# Patient Record
Sex: FEMALE | Race: OTHER | HISPANIC OR LATINO | Employment: FULL TIME | URBAN - METROPOLITAN AREA
[De-identification: names, ages, dates, MRNs, and addresses within clinical notes are randomized per-mention and may not be internally consistent; named-entity substitution may affect disease eponyms.]

---

## 2022-11-10 ENCOUNTER — PRE-ADMISSION TESTING (OUTPATIENT)
Dept: ADMISSIONS | Facility: MEDICAL CENTER | Age: 37
End: 2022-11-10
Attending: SPECIALIST
Payer: MEDICAID

## 2022-11-10 DIAGNOSIS — Z01.812 PRE-OPERATIVE LABORATORY EXAMINATION: ICD-10-CM

## 2022-11-10 LAB
ANION GAP SERPL CALC-SCNC: 12 MMOL/L (ref 7–16)
BASOPHILS # BLD AUTO: 0.7 % (ref 0–1.8)
BASOPHILS # BLD: 0.05 K/UL (ref 0–0.12)
BUN SERPL-MCNC: 18 MG/DL (ref 8–22)
CALCIUM SERPL-MCNC: 9.2 MG/DL (ref 8.5–10.5)
CHLORIDE SERPL-SCNC: 106 MMOL/L (ref 96–112)
CO2 SERPL-SCNC: 21 MMOL/L (ref 20–33)
CREAT SERPL-MCNC: 0.69 MG/DL (ref 0.5–1.4)
EOSINOPHIL # BLD AUTO: 0.09 K/UL (ref 0–0.51)
EOSINOPHIL NFR BLD: 1.3 % (ref 0–6.9)
ERYTHROCYTE [DISTWIDTH] IN BLOOD BY AUTOMATED COUNT: 45.4 FL (ref 35.9–50)
GFR SERPLBLD CREATININE-BSD FMLA CKD-EPI: 114 ML/MIN/1.73 M 2
GLUCOSE SERPL-MCNC: 83 MG/DL (ref 65–99)
HCT VFR BLD AUTO: 42 % (ref 37–47)
HGB BLD-MCNC: 13.7 G/DL (ref 12–16)
IMM GRANULOCYTES # BLD AUTO: 0.03 K/UL (ref 0–0.11)
IMM GRANULOCYTES NFR BLD AUTO: 0.4 % (ref 0–0.9)
LYMPHOCYTES # BLD AUTO: 1.91 K/UL (ref 1–4.8)
LYMPHOCYTES NFR BLD: 28.2 % (ref 22–41)
MCH RBC QN AUTO: 30.4 PG (ref 27–33)
MCHC RBC AUTO-ENTMCNC: 32.6 G/DL (ref 33.6–35)
MCV RBC AUTO: 93.3 FL (ref 81.4–97.8)
MONOCYTES # BLD AUTO: 0.54 K/UL (ref 0–0.85)
MONOCYTES NFR BLD AUTO: 8 % (ref 0–13.4)
NEUTROPHILS # BLD AUTO: 4.15 K/UL (ref 2–7.15)
NEUTROPHILS NFR BLD: 61.4 % (ref 44–72)
NRBC # BLD AUTO: 0 K/UL
NRBC BLD-RTO: 0 /100 WBC
PLATELET # BLD AUTO: 293 K/UL (ref 164–446)
PMV BLD AUTO: 11.3 FL (ref 9–12.9)
POTASSIUM SERPL-SCNC: 4.7 MMOL/L (ref 3.6–5.5)
RBC # BLD AUTO: 4.5 M/UL (ref 4.2–5.4)
SODIUM SERPL-SCNC: 139 MMOL/L (ref 135–145)
WBC # BLD AUTO: 6.8 K/UL (ref 4.8–10.8)

## 2022-11-10 PROCEDURE — 80048 BASIC METABOLIC PNL TOTAL CA: CPT

## 2022-11-10 PROCEDURE — 85025 COMPLETE CBC W/AUTO DIFF WBC: CPT

## 2022-11-10 PROCEDURE — 36415 COLL VENOUS BLD VENIPUNCTURE: CPT

## 2022-11-10 RX ORDER — MULTIVITAMIN
1 TABLET ORAL DAILY
COMMUNITY
End: 2024-01-10

## 2022-11-16 NOTE — H&P
IDENTIFICATION:  The patient is a very pleasant 37-year-old multipara (para 2,   with 2 previous vaginal deliveries).     CHIEF COMPLAINT:  The patient complains of menometrorrhagia.     HISTORY OF PRESENT ILLNESS:  The patient has been complaining of   menometrorrhagia and also periovulatory bleeding and also postcoital bleeding.    When I saw the patient on 9/6/2022, I did on that day perform a transvaginal   pelvic ultrasound because of her recent irregular/abnormal uterine bleeding   as well as postcoital bleeding and this ultrasound revealed that the   endometrial stripe was somewhat thickened at 16 mm.  I recommended to her that   we proceed with hysteroscopy and D and C and in fact fractional D and C. She   is scheduled to have a hysteroscopy, D and C (fractional D and C) and   hydrothermal endometrial ablation.  Of note, she also requests permanent tubal   sterilization and so we will proceed that only with hysteroscopy, fractional   D and C, and hydrothermal endometrial ablation but also laparoscopy with   laparoscopic bilateral tubal sterilization procedure, namely laparoscopic   bilateral salpingectomy.     PAST MEDICAL HISTORY:  The patient says that she has no medical illnesses.     PAST SURGICAL HISTORY:  The patient says she has had no previous surgeries.     MEDICATIONS:  The patient says she takes no medications.     ALLERGIES:  The patient says she has no known drug allergies.     SOCIAL HISTORY:  The patient denies smoking.  She denies consuming alcoholic   beverages.  She denies the use of recreational drugs.     REVIEW OF SYSTEMS:  GENERAL:  The patient denies any fevers or chills or sweats.  PULMONARY:  The patient recently has had an upper respiratory tract infection   and has had a nonproductive cough and experiences chest pain with coughing.    She denies any wheezing and she denies any shortness of breath.  CARDIOVASCULAR:  The patient denies any palpitations, chest pain (except for    chest pain with coughing), dyspnea.  GASTROINTESTINAL:  The patient denies any nausea, vomiting, diarrhea,   constipation, hematochezia, melena.  GENITOURINARY:  The patient is experiencing menometrorrhagia and postcoital   bleeding and periovulatory bleeding.  She does not report any pelvic pain or   dysmenorrhea.  MUSCULOSKELETAL:  The patient denies any arthralgias or myalgias.  NEUROLOGICAL:  The patient denies any headaches or syncope or seizures.     PHYSICAL EXAMINATION:  VITAL SIGNS:  The patient's vital signs are stable and she is afebrile.  Her   recent blood pressure is 138/76.  Her stated height of 5 feet 1 inches and   weight of 146 pounds correspond to a body mass index of 27.6.  GENERAL:  The patient appears well developed and well nourished and relaxed   and alert and comfortable and in no apparent distress.  HEENT:  Normocephalic, atraumatic.  Pupils equal, round, reactive to light and   accommodation.  Extraocular motions intact.  Pharynx clear.  There is no   thyromegaly.  There is no cervical lymphadenopathy.  CHEST:  Regular rate and rhythm, no murmur.  LUNGS:  The lungs are clear to auscultation bilaterally.  ABDOMEN:  Examination of the patient's abdomen with the patient in the dorsal   supine position reveals that the abdomen is soft and nontender and   nondistended and there is no evidence of hepatomegaly and there is no evidence   of splenomegaly and there is no evidence detected of any abdominal masses.  PELVIC:  Bimanual exam reveals no evidence of cervical motion tenderness, no   evidence of any tenderness to palpation of the uterine corpus, no evidence of   any uterine enlargement and no evidence of any adnexal masses or tenderness   either on the right or the left.  EXTREMITIES:  No clubbing, cyanosis or edema.  NEUROLOGIC:  Nonfocal.     ASSESSMENT:  1.  Menometrorrhagia.  2.  The patient desires permanent tubal sterilization.  3.  It appears the patient has an upper respiratory  tract infection and   perhaps even a bronchitis and appears that this is resolving and is not   severe.     PLAN:  The patient is scheduled for hysteroscopy, fractional D and C and   hydrothermal endometrial ablation to be followed by laparoscopy, laparoscopic   bilateral salpingectomy.  I have discussed with the patient and explained to   the patient in detail and at length what hysteroscopy, fractional D and C, and   hydrothermal endometrial ablation along with laparoscopy with laparoscopic   bilateral salpingectomy is and one such procedures involved along with the   risks and benefits and alternatives and after our discussions and after   answering her questions, she told me that she very much wishes for us to   proceed with hysteroscopy, fractional D and C, and hydrothermal endometrial   ablation followed by laparoscopy with laparoscopic bilateral salpingectomy.        ______________________________  MD JOSE Romano/OLGA LIDIA    DD:  11/15/2022 16:25  DT:  11/15/2022 17:48    Job#:  [id number]

## 2022-11-17 ENCOUNTER — HOSPITAL ENCOUNTER (OUTPATIENT)
Facility: MEDICAL CENTER | Age: 37
End: 2022-11-17
Attending: SPECIALIST | Admitting: SPECIALIST
Payer: MEDICAID

## 2022-11-17 ENCOUNTER — ANESTHESIA EVENT (OUTPATIENT)
Dept: SURGERY | Facility: MEDICAL CENTER | Age: 37
End: 2022-11-17
Payer: MEDICAID

## 2022-11-17 ENCOUNTER — ANESTHESIA (OUTPATIENT)
Dept: SURGERY | Facility: MEDICAL CENTER | Age: 37
End: 2022-11-17
Payer: MEDICAID

## 2022-11-17 VITALS
BODY MASS INDEX: 27.68 KG/M2 | OXYGEN SATURATION: 95 % | DIASTOLIC BLOOD PRESSURE: 70 MMHG | RESPIRATION RATE: 20 BRPM | SYSTOLIC BLOOD PRESSURE: 116 MMHG | HEIGHT: 61 IN | WEIGHT: 146.61 LBS | TEMPERATURE: 98.2 F | HEART RATE: 84 BPM

## 2022-11-17 DIAGNOSIS — G89.18 POST-OP PAIN: ICD-10-CM

## 2022-11-17 PROBLEM — N92.0 MENORRHAGIA: Status: ACTIVE | Noted: 2022-11-17

## 2022-11-17 PROBLEM — Z30.2 ENCOUNTER FOR STERILIZATION: Status: ACTIVE | Noted: 2022-11-17

## 2022-11-17 LAB
HCG UR QL: NEGATIVE
PATHOLOGY CONSULT NOTE: NORMAL

## 2022-11-17 PROCEDURE — 160002 HCHG RECOVERY MINUTES (STAT): Performed by: SPECIALIST

## 2022-11-17 PROCEDURE — 160046 HCHG PACU - 1ST 60 MINS PHASE II: Performed by: SPECIALIST

## 2022-11-17 PROCEDURE — 160025 RECOVERY II MINUTES (STATS): Performed by: SPECIALIST

## 2022-11-17 PROCEDURE — 700111 HCHG RX REV CODE 636 W/ 250 OVERRIDE (IP): Performed by: ANESTHESIOLOGY

## 2022-11-17 PROCEDURE — 00840 ANES IPER PX LOWER ABD NOS: CPT | Performed by: ANESTHESIOLOGY

## 2022-11-17 PROCEDURE — 700102 HCHG RX REV CODE 250 W/ 637 OVERRIDE(OP): Performed by: ANESTHESIOLOGY

## 2022-11-17 PROCEDURE — 160047 HCHG PACU  - EA ADDL 30 MINS PHASE II: Performed by: SPECIALIST

## 2022-11-17 PROCEDURE — 700105 HCHG RX REV CODE 258: Performed by: SPECIALIST

## 2022-11-17 PROCEDURE — 88302 TISSUE EXAM BY PATHOLOGIST: CPT

## 2022-11-17 PROCEDURE — 160009 HCHG ANES TIME/MIN: Performed by: SPECIALIST

## 2022-11-17 PROCEDURE — 160048 HCHG OR STATISTICAL LEVEL 1-5: Performed by: SPECIALIST

## 2022-11-17 PROCEDURE — 110371 HCHG SHELL REV 272: Performed by: SPECIALIST

## 2022-11-17 PROCEDURE — 88305 TISSUE EXAM BY PATHOLOGIST: CPT

## 2022-11-17 PROCEDURE — A9270 NON-COVERED ITEM OR SERVICE: HCPCS | Performed by: SPECIALIST

## 2022-11-17 PROCEDURE — 160035 HCHG PACU - 1ST 60 MINS PHASE I: Performed by: SPECIALIST

## 2022-11-17 PROCEDURE — 160041 HCHG SURGERY MINUTES - EA ADDL 1 MIN LEVEL 4: Performed by: SPECIALIST

## 2022-11-17 PROCEDURE — 700102 HCHG RX REV CODE 250 W/ 637 OVERRIDE(OP): Performed by: SPECIALIST

## 2022-11-17 PROCEDURE — 81025 URINE PREGNANCY TEST: CPT

## 2022-11-17 PROCEDURE — A9270 NON-COVERED ITEM OR SERVICE: HCPCS | Performed by: ANESTHESIOLOGY

## 2022-11-17 PROCEDURE — 700101 HCHG RX REV CODE 250: Performed by: SPECIALIST

## 2022-11-17 PROCEDURE — 700105 HCHG RX REV CODE 258: Performed by: ANESTHESIOLOGY

## 2022-11-17 PROCEDURE — 160029 HCHG SURGERY MINUTES - 1ST 30 MINS LEVEL 4: Performed by: SPECIALIST

## 2022-11-17 PROCEDURE — 700101 HCHG RX REV CODE 250: Performed by: ANESTHESIOLOGY

## 2022-11-17 RX ORDER — HYDROMORPHONE HYDROCHLORIDE 1 MG/ML
0.1 INJECTION, SOLUTION INTRAMUSCULAR; INTRAVENOUS; SUBCUTANEOUS
Status: DISCONTINUED | OUTPATIENT
Start: 2022-11-17 | End: 2022-11-17 | Stop reason: HOSPADM

## 2022-11-17 RX ORDER — DIPHENHYDRAMINE HYDROCHLORIDE 50 MG/ML
12.5 INJECTION INTRAMUSCULAR; INTRAVENOUS
Status: DISCONTINUED | OUTPATIENT
Start: 2022-11-17 | End: 2022-11-17 | Stop reason: HOSPADM

## 2022-11-17 RX ORDER — DEXAMETHASONE SODIUM PHOSPHATE 4 MG/ML
INJECTION, SOLUTION INTRA-ARTICULAR; INTRALESIONAL; INTRAMUSCULAR; INTRAVENOUS; SOFT TISSUE PRN
Status: DISCONTINUED | OUTPATIENT
Start: 2022-11-17 | End: 2022-11-17 | Stop reason: SURG

## 2022-11-17 RX ORDER — OXYCODONE HCL 5 MG/5 ML
5 SOLUTION, ORAL ORAL
Status: COMPLETED | OUTPATIENT
Start: 2022-11-17 | End: 2022-11-17

## 2022-11-17 RX ORDER — MIDAZOLAM HYDROCHLORIDE 1 MG/ML
INJECTION INTRAMUSCULAR; INTRAVENOUS PRN
Status: DISCONTINUED | OUTPATIENT
Start: 2022-11-17 | End: 2022-11-17 | Stop reason: SURG

## 2022-11-17 RX ORDER — HYDROMORPHONE HYDROCHLORIDE 1 MG/ML
0.2 INJECTION, SOLUTION INTRAMUSCULAR; INTRAVENOUS; SUBCUTANEOUS
Status: DISCONTINUED | OUTPATIENT
Start: 2022-11-17 | End: 2022-11-17 | Stop reason: HOSPADM

## 2022-11-17 RX ORDER — SODIUM CHLORIDE, SODIUM LACTATE, POTASSIUM CHLORIDE, CALCIUM CHLORIDE 600; 310; 30; 20 MG/100ML; MG/100ML; MG/100ML; MG/100ML
INJECTION, SOLUTION INTRAVENOUS CONTINUOUS
Status: DISCONTINUED | OUTPATIENT
Start: 2022-11-17 | End: 2022-11-17 | Stop reason: HOSPADM

## 2022-11-17 RX ORDER — ONDANSETRON 2 MG/ML
4 INJECTION INTRAMUSCULAR; INTRAVENOUS ONCE
Status: COMPLETED | OUTPATIENT
Start: 2022-11-17 | End: 2022-11-17

## 2022-11-17 RX ORDER — CEFAZOLIN SODIUM 1 G/3ML
INJECTION, POWDER, FOR SOLUTION INTRAMUSCULAR; INTRAVENOUS PRN
Status: DISCONTINUED | OUTPATIENT
Start: 2022-11-17 | End: 2022-11-17 | Stop reason: SURG

## 2022-11-17 RX ORDER — SODIUM CHLORIDE, SODIUM LACTATE, POTASSIUM CHLORIDE, CALCIUM CHLORIDE 600; 310; 30; 20 MG/100ML; MG/100ML; MG/100ML; MG/100ML
INJECTION, SOLUTION INTRAVENOUS
Status: DISCONTINUED | OUTPATIENT
Start: 2022-11-17 | End: 2022-11-17 | Stop reason: SURG

## 2022-11-17 RX ORDER — OXYCODONE HYDROCHLORIDE AND ACETAMINOPHEN 5; 325 MG/1; MG/1
1 TABLET ORAL EVERY 6 HOURS PRN
Qty: 28 TABLET | Refills: 0 | Status: SHIPPED | OUTPATIENT
Start: 2022-11-17 | End: 2022-11-24

## 2022-11-17 RX ORDER — BACITRACIN ZINC 500 [USP'U]/G
OINTMENT TOPICAL
Status: DISCONTINUED | OUTPATIENT
Start: 2022-11-17 | End: 2022-11-17 | Stop reason: HOSPADM

## 2022-11-17 RX ORDER — MEPERIDINE HYDROCHLORIDE 25 MG/ML
12.5 INJECTION INTRAMUSCULAR; INTRAVENOUS; SUBCUTANEOUS
Status: DISCONTINUED | OUTPATIENT
Start: 2022-11-17 | End: 2022-11-17 | Stop reason: HOSPADM

## 2022-11-17 RX ORDER — OXYCODONE HCL 5 MG/5 ML
10 SOLUTION, ORAL ORAL
Status: COMPLETED | OUTPATIENT
Start: 2022-11-17 | End: 2022-11-17

## 2022-11-17 RX ORDER — SODIUM CHLORIDE, SODIUM LACTATE, POTASSIUM CHLORIDE, CALCIUM CHLORIDE 600; 310; 30; 20 MG/100ML; MG/100ML; MG/100ML; MG/100ML
INJECTION, SOLUTION INTRAVENOUS CONTINUOUS
Status: ACTIVE | OUTPATIENT
Start: 2022-11-17 | End: 2022-11-17

## 2022-11-17 RX ORDER — HYDROMORPHONE HYDROCHLORIDE 1 MG/ML
0.4 INJECTION, SOLUTION INTRAMUSCULAR; INTRAVENOUS; SUBCUTANEOUS
Status: DISCONTINUED | OUTPATIENT
Start: 2022-11-17 | End: 2022-11-17 | Stop reason: HOSPADM

## 2022-11-17 RX ORDER — ONDANSETRON 2 MG/ML
INJECTION INTRAMUSCULAR; INTRAVENOUS PRN
Status: DISCONTINUED | OUTPATIENT
Start: 2022-11-17 | End: 2022-11-17 | Stop reason: SURG

## 2022-11-17 RX ORDER — ACETAMINOPHEN 500 MG
1000 TABLET ORAL ONCE
Status: COMPLETED | OUTPATIENT
Start: 2022-11-17 | End: 2022-11-17

## 2022-11-17 RX ORDER — BACITRACIN ZINC 500 [USP'U]/G
OINTMENT TOPICAL
Status: DISCONTINUED
Start: 2022-11-17 | End: 2022-11-17 | Stop reason: HOSPADM

## 2022-11-17 RX ORDER — KETOROLAC TROMETHAMINE 30 MG/ML
INJECTION, SOLUTION INTRAMUSCULAR; INTRAVENOUS PRN
Status: DISCONTINUED | OUTPATIENT
Start: 2022-11-17 | End: 2022-11-17 | Stop reason: SURG

## 2022-11-17 RX ADMIN — DEXAMETHASONE SODIUM PHOSPHATE 4 MG: 4 INJECTION, SOLUTION INTRA-ARTICULAR; INTRALESIONAL; INTRAMUSCULAR; INTRAVENOUS; SOFT TISSUE at 13:22

## 2022-11-17 RX ADMIN — FENTANYL CITRATE 100 MCG: 50 INJECTION, SOLUTION INTRAMUSCULAR; INTRAVENOUS at 13:31

## 2022-11-17 RX ADMIN — FENTANYL CITRATE 50 MCG: 50 INJECTION, SOLUTION INTRAMUSCULAR; INTRAVENOUS at 14:03

## 2022-11-17 RX ADMIN — FENTANYL CITRATE 50 MCG: 50 INJECTION, SOLUTION INTRAMUSCULAR; INTRAVENOUS at 14:24

## 2022-11-17 RX ADMIN — MIDAZOLAM HYDROCHLORIDE 2 MG: 1 INJECTION, SOLUTION INTRAMUSCULAR; INTRAVENOUS at 13:00

## 2022-11-17 RX ADMIN — ROCURONIUM BROMIDE 30 MG: 10 INJECTION, SOLUTION INTRAVENOUS at 13:35

## 2022-11-17 RX ADMIN — FENTANYL CITRATE 25 MCG: 50 INJECTION INTRAMUSCULAR; INTRAVENOUS at 15:30

## 2022-11-17 RX ADMIN — CEFAZOLIN 2 G: 330 INJECTION, POWDER, FOR SOLUTION INTRAMUSCULAR; INTRAVENOUS at 13:17

## 2022-11-17 RX ADMIN — FENTANYL CITRATE 25 MCG: 50 INJECTION INTRAMUSCULAR; INTRAVENOUS at 15:45

## 2022-11-17 RX ADMIN — SODIUM CHLORIDE, POTASSIUM CHLORIDE, SODIUM LACTATE AND CALCIUM CHLORIDE: 600; 310; 30; 20 INJECTION, SOLUTION INTRAVENOUS at 13:00

## 2022-11-17 RX ADMIN — PROPOFOL 200 MG: 10 INJECTION, EMULSION INTRAVENOUS at 13:03

## 2022-11-17 RX ADMIN — FENTANYL CITRATE 25 MCG: 50 INJECTION INTRAMUSCULAR; INTRAVENOUS at 15:17

## 2022-11-17 RX ADMIN — FENTANYL CITRATE 25 MCG: 50 INJECTION INTRAMUSCULAR; INTRAVENOUS at 16:23

## 2022-11-17 RX ADMIN — SUGAMMADEX 200 MG: 100 INJECTION, SOLUTION INTRAVENOUS at 14:24

## 2022-11-17 RX ADMIN — OXYCODONE HYDROCHLORIDE 10 MG: 5 SOLUTION ORAL at 16:40

## 2022-11-17 RX ADMIN — KETOROLAC TROMETHAMINE 30 MG: 30 INJECTION, SOLUTION INTRAMUSCULAR at 14:32

## 2022-11-17 RX ADMIN — ONDANSETRON 4 MG: 2 INJECTION INTRAMUSCULAR; INTRAVENOUS at 15:10

## 2022-11-17 RX ADMIN — ROCURONIUM BROMIDE 50 MG: 10 INJECTION, SOLUTION INTRAVENOUS at 13:04

## 2022-11-17 RX ADMIN — SODIUM CHLORIDE, POTASSIUM CHLORIDE, SODIUM LACTATE AND CALCIUM CHLORIDE: 600; 310; 30; 20 INJECTION, SOLUTION INTRAVENOUS at 11:52

## 2022-11-17 RX ADMIN — ACETAMINOPHEN 1000 MG: 500 TABLET ORAL at 15:10

## 2022-11-17 RX ADMIN — ONDANSETRON 4 MG: 2 INJECTION INTRAMUSCULAR; INTRAVENOUS at 13:22

## 2022-11-17 RX ADMIN — FENTANYL CITRATE 50 MCG: 50 INJECTION, SOLUTION INTRAMUSCULAR; INTRAVENOUS at 14:34

## 2022-11-17 ASSESSMENT — PAIN DESCRIPTION - PAIN TYPE: TYPE: SURGICAL PAIN

## 2022-11-17 ASSESSMENT — PAIN SCALES - GENERAL: PAIN_LEVEL: 1

## 2022-11-17 NOTE — ANESTHESIA PROCEDURE NOTES
Airway    Date/Time: 11/17/2022 1:10 PM  Performed by: Jun Flores M.D.  Authorized by: Jun Flores M.D.     Location:  OR  Urgency:  Elective  Indications for Airway Management:  Anesthesia      Spontaneous Ventilation: absent    Sedation Level:  Deep  Preoxygenated: Yes    Patient Position:  Sniffing  Final Airway Type:  Endotracheal airway  Final Endotracheal Airway:  ETT  Cuffed: Yes    Technique Used for Successful ETT Placement:  Direct laryngoscopy    Insertion Site:  Oral  Blade Type:  Alvarez  Laryngoscope Blade/Videolaryngoscope Blade Size:  2  ETT Size (mm):  7.5  Measured from:  Teeth  ETT to Teeth (cm):  21  Placement Verified by: auscultation and capnometry    Cormack-Lehane Classification:  Grade I - full view of glottis  Number of Attempts at Approach:  1

## 2022-11-17 NOTE — DISCHARGE INSTRUCTIONS
Pelvic Laparoscopy Discharge instructions    ACTIVITIES:  DO NOT USE tampons, douche, or have sexual intercourse until cleared at your follow-up appointment.    After discharge from the hospital, rest today, then you may resume full routine activities. However, there should be no heavy lifting (greater than 10 pounds), avoid straining, and no strenuous activities until after your follow-up visit. Otherwise, routine activities of daily living are acceptable.    DRIVING:   You may drive whenever you are off pain medications and are able to perform the activities needed to drive, i.e. turning, bending, twisting, etc.    BATHING:   You may shower starting tomorrow pat incisions dry with a clean towel do not rub. No tub baths, hot tubs, or swimming until your follow up appointment.     WOUND CARE:  You will have one or more small incisions. If you have wound dressings, they may come off after 48 hours. If you have skin glue to the wound, this will fall off on its own, do not pick at it. If you have steri strips to the wound, these will fall off on their own, do not pick at them, may trim the edges if needed.     You may experience discomfort in the shoulder area, mild breathing difficulty, aching in the upper back and bloating for 2 to 3 days after this procedure due to the air inflated into the abdomen during your surgery.     Expect some vaginal bleeding, however, if you pass clots or saturate a cj pad more often than once an hour or are not comfortable with the amount of blood you notice please notify your provider.      BOWEL FUNCTION:  Constipation is common after surgery. The combination of pain medication and decreased activity level can cause constipation in otherwise normal patients. If you feel this is occurring, take a laxative (Milk of Magnesia, Ex-Lax, Senokot, etc.) until the problem has been resolved. It also helps to stay regular by including fiber in your diet (for example bran or fruits and  vegetables) and drink plenty of liquids (water, juice, etc.).    What to Expect Post Anesthesia    Rest and take it easy for the first 24 hours.  A responsible adult is recommended to remain with you during that time.  It is normal to feel sleepy.  We encourage you to not do anything that requires balance, judgment or coordination.    FOR 24 HOURS DO NOT:  Drive, operate machinery or run household appliances.  Drink beer or alcoholic beverages.  Make important decisions or sign legal documents.    To avoid nausea, slowly advance diet as tolerated, avoiding spicy or greasy foods for the first day.  Add more substantial food to your diet according to your provider's instructions.  Babies can be fed formula or breast milk as soon as they are hungry.  INCREASE FLUIDS AND FIBER TO AVOID CONSTIPATION.    MILD FLU-LIKE SYMPTOMS ARE NORMAL.  YOU MAY EXPERIENCE GENERALIZED MUSCLE ACHES, THROAT IRRITATION, HEADACHE AND/OR SOME NAUSEA.    If any questions arise, call your provider.  If your provider is not available, please feel free to call the [facility] at [phone].    MEDICATIONS: Resume taking daily medication.  Take prescribed pain medication with food.  If no medication is prescribed, you may take non-aspirin pain medication if needed.  PAIN MEDICATION CAN BE VERY CONSTIPATING.  Take a stool softener or laxative such as senokot, pericolace, or milk of magnesia if needed.    Last pain medication tylenol given at 3:00PM, next dose of tylenol can be taken at 9:00PM. Take ibuprofen at 6:00PM and alternate between tylenol and ibuprofen every 3 hours. Prescribed pain medication percocet (oxycodone-tylenol) can be taken at any time.

## 2022-11-17 NOTE — ANESTHESIA PREPROCEDURE EVALUATION
Case: [id number] Date/Time: 11/17/22 1215    Procedures:       HYSTEROSCOPIC FRACTIONAL DILATION AND CURETTAGE, HYDROTHERMAL ENDOMETRIAL ABLATION, LAPAROSCOPIC BILATERAL SALPINGECTOMY      DILATION AND CURETTAGE      LAPAROSCOPY      SALPINGECTOMY    Pre-op diagnosis: MENOMETRORRHAGIA, POST COITAL BLEEDING, THICKENED ENDOMETRIAL STRIPE    Location: [facility] ROOM 25 / SURGERY SAME DAY [facility]    Surgeons: Tato Palomino M.D.          Relevant Problems   No relevant active problems       Physical Exam    Airway   Mallampati: II  TM distance: >3 FB  Neck ROM: full       Cardiovascular - normal exam  Rhythm: regular  Rate: normal  (-) murmur     Dental - normal exam           Pulmonary - normal exam  Breath sounds clear to auscultation     Abdominal    Neurological - normal exam                 Anesthesia Plan    ASA 1       Plan - general       Airway plan will be ETT          Induction: intravenous    Postoperative Plan: Postoperative administration of opioids is intended.    Pertinent diagnostic labs and testing reviewed    Informed Consent:    Anesthetic plan and risks discussed with patient.    Use of blood products discussed with: patient whom consented to blood products.

## 2022-11-17 NOTE — OR NURSING
1442 - Pt to PACU from OR. Report from anesthesia and OR RN. On 6L O2 via mask with oral airway in place. Respirations even and unlabored. VSS. Dutton in place, x2 lap sites to abdomen, covered with dressings, CDI, soft. Minimal drainage to peripad.    1449 - Oral airway DC'd.    1500 -Patient requesting to take tylenol, does not want any narcotics. Patient reports cramping pain in abdomen 5/10. Order received for 1000mg of tylenol from Dr. Flores. Patient drinking water with no problems using cell phone.    1510 - Patient medicated with tylenol for pain and zofran for nausea. Drinking water with no problem.    1517 - Medicated per MAR for pain.     1530 - Subsequent dose given for pain per MAR. Update given to  over the phone by patient.    1545 - Subsequent dose given for pain per MAR.    1623 - Subsequent dose given for pain per MAR.    1640 - Patient now agreeable to oral pain medication, given per MAR. Patient eating crackers and juice.    1720 - Patient ready to get up. Dutton catheter back filled with 300mL and was removed. Up to bathroom, dressed, and able to void 300mL. Patient updated  over the phone.    1755 - Discharge instructions discussed with  Morgan. All questions answered. Verbalized understanding. He is making his way to pick her up.    1830 - Pt escorted out of department in wheelchair with all belongings. Discharged home to responsible adult. PIV removed with tip intact.

## 2022-11-17 NOTE — ANESTHESIA POSTPROCEDURE EVALUATION
Patient: Jazzy Pryor    Procedure Summary     Date: 11/17/22 Room / Location: [facility] ROOM 25 / SURGERY SAME DAY [facility]    Anesthesia Start: 1300 Anesthesia Stop: 1443    Procedures:       HYSTEROSCOPIC FRACTIONAL DILATION AND CURETTAGE, HYDROTHERMAL ENDOMETRIAL ABLATION (Uterus)      LAPROSCOPIC BILATERAL SALPINGECTOMY (Bilateral: Pelvis) Diagnosis: (MENOMETRORRHAGIA, POST COITAL BLEEDING, THICKENED ENDOMETRIAL STRIPE)    Surgeons: Tato Palomino M.D. Responsible Provider: Jun Flores M.D.    Anesthesia Type: general ASA Status: 1          Final Anesthesia Type: general  Last vitals  BP   Blood Pressure: 112/72    Temp   36.8 °C (98.2 °F)    Pulse   67   Resp   17    SpO2   93 %      Anesthesia Post Evaluation    Patient location during evaluation: PACU  Patient participation: complete - patient participated  Level of consciousness: awake and alert  Pain score: 1    Airway patency: patent  Anesthetic complications: no  Cardiovascular status: adequate and hemodynamically stable  Respiratory status: acceptable  Hydration status: acceptable    PONV: none          No notable events documented.     Nurse Pain Score: 0 (NPRS)

## 2022-11-17 NOTE — ANESTHESIA TIME REPORT
Anesthesia Start and Stop Event Times     Date Time Event    11/17/2022 1159 Ready for Procedure     1300 Anesthesia Start     1443 Anesthesia Stop        Responsible Staff  11/17/22    Name Role Begin End    Jun Flores M.D. Anesth 1300 1443        Overtime Reason:  no overtime (within assigned shift)    Comments:

## 2022-11-17 NOTE — OR SURGEON
Immediate Post OP Note    PreOp Diagnosis:   1.  Menometrorrhagia.  2.  The patient desires permanent tubal sterilization.    PostOp Diagnosis:   1.  Menometrorrhagia.  2.  The patient desires permanent tubal sterilization.    Procedure(s):  HYSTEROSCOPIC FRACTIONAL DILATION AND CURETTAGE, HYDROTHERMAL ENDOMETRIAL ABLATION - Wound Class: Clean Contaminated  LAPROSCOPIC BILATERAL SALPINGECTOMY - Wound Class: Clean Contaminated    Surgeon(s):  Tato Palomino M.D.    Anesthesiologist/Type of Anesthesia:  Anesthesiologist: Jun Flores M.D./General    Surgical Staff:  Circulator: Tita Zaragoza R.N.  Relief Scrub: Tracy Carranza; Vinicio Delcid R.N.  Scrub Person: Paresh Burr    Specimens removed if any:  ID Type Source Tests Collected by Time Destination   A : ENDOMETRIAL CURRETTINGS  Other Other PATHOLOGY SPECIMEN Tato Palomino M.D. 11/17/2022  1:30 PM    B : BILATERAL FALLOPIAN TUBES  Tissue Fallopian Tube PATHOLOGY SPECIMEN Tato Palomino M.D. 11/17/2022  2:17 PM        Estimated Blood Loss:   Less than 20 cc's.     Findings:   Speculum exam under anesthesia reveals no vulvar or vaginal or cervical lesions.  During hysteroscopy views of the intrauterine cavity are obtained.  The left tubal ostia is identified during hysteroscopy.  During hysteroscopy no evidence of any congenital uterine anomaly is seen and no evidence of endometrial polyp is seen and no evidence of any submucosal fibroid is seen.  During and following hydrothermal endometrial ablation the entire intrauterine cavity is found to become nicely and thoroughly ablated.  During laparoscopy excellent views of the pelvis are obtained.  There is a small amount of hemoperitoneum in the cul-de-sac and this finding is consistent with retrograde menstruation.  Both uterosacral ligaments appear normal and the uterus appears normal and both fallopian tubes are normal and both ovaries are normal.    Complications:   None.      11/17/2022 2:40 PM  Tato Palomino M.D.

## 2022-11-18 NOTE — OP REPORT
DATE OF SERVICE:  11/17/2022     PREOPERATIVE DIAGNOSES:  1.  Menometrorrhagia.  2.  The patient desires permanent tubal sterilization.     POSTOPERATIVE DIAGNOSES:    1.  Menometrorrhagia.  2.  The patient desires permanent tubal sterilization.     PROCEDURES:  Hysteroscopy, D and C, hydrothermal endometrial ablation followed   by laparoscopy with laparoscopic bilateral salpingectomy.     SURGEON:  Tato Palomino MD     ANESTHESIA:  General endotracheal tube anesthesia.     ANESTHESIOLOGIST:  Jun Flores MD     FINDINGS:  Speculum exam under anesthesia reveals no vulvar or vaginal or   cervical lesions.  The cervix is well visualized.  During hysteroscopy, views   of the intrauterine cavity are obtained.  The left tubal ostia is identified   during hysteroscopy.     During hysteroscopy, no evidence of any congenital uterine anomaly is seen and   no evidence of any endometrial polyp is seen and no evidence of any   submucosal fibroid is seen.     During and following hydrothermal endometrial ablation, the entire   intrauterine cavity was found to become nicely and thoroughly ablated.     During laparoscopy, excellent views of the pelvis were obtained.  There was a   small amount of hemoperitoneum in the cul-de-sac seen during laparoscopy and   this finding is consistent with retrograde menstruation.  During laparoscopy,   both uterosacral ligaments appeared normal and the uterus appears normal and   both fallopian tubes are normal and both ovaries are normal.     SPECIMENS:  1.  Endometrial curettings.  2.  Bilateral fallopian tubes.     COMPLICATIONS:  None.     ESTIMATED BLOOD LOSS:  Less than 20 mL.     DESCRIPTION OF PROCEDURE:  After the appropriate consents have been obtained,   the patient was taken to the operating room and given general anesthesia.  She   was prepped and draped in the dorsal lithotomy position and a Dutton catheter   was noted to be in place and draining urine.  Speculum exam was  performed and   reveals no vulvar or vaginal or cervical lesions.  The cervix was well   visualized and appears multiparous.  The anterior aspect of the cervix was   grasped with a single tooth tenaculum.  The cervix was dilated with Hanks and   then Hegar dilators and was dilated to a Hegar #8.  The hysteroscope was   inserted through the endocervical canal into the intrauterine cavity and   hysteroscopy was performed and during hysteroscopy, views of the intrauterine   cavity were obtained.  The left tubal ostia was identified.  During   hysteroscopy, no evidence of any congenital uterine anomaly was appreciated   and during hysteroscopy, no evidence of any endometrial polyp was seen and   during hysteroscopy, no evidence of any submucosal fibroid was seen.  The   hysteroscope was removed and the sharp curette was introduced through the   endocervical canal into the intrauterine cavity and all 4 quadrants of the   intrauterine cavity thoroughly curetted and curettings were submitted on a   Telfa pad.  The sharp curette was removed.  The hysteroscope was reinserted   through the endocervical canal into the intrauterine cavity and hysteroscopy   was repeated and again findings were as noted above.  Then, hydrothermal   endometrial ablation was performed.  After the usual testing was performed and   then after the normal approximately 90 seconds warmup period, hydrothermal   endometrial ablation was continued for 10 minutes and during this time, the   intrauterine cavity was found to become nicely and thoroughly ablated.  Then,   after the usual 90 second cool down, hysteroscopy was continued and pictures   were taken and the entire intrauterine cavity was found to be thoroughly   ablated.  The hysteroscope was removed.  The single tooth tenaculum was   removed from the cervix.  Pressure was placed against the cervix for a few   moments with a gauze sponge.  The gauze sponge was removed and the cervix was    re-examined and some bleeding was seen coming from the site of attachment of   the single tooth tenaculum and this bleeding was controlled with the   application of silver nitrate.  The cervix was re-examined and this time, no   bleeding seen coming from the cervix either from the site of attachment of the   single tooth tenaculum or from the external cervical os.  The speculum was   removed.  The 's gloves were changed.  Attention was then directed to   the abdomen where a small (approximately 1.5 cm) horizontal infraumbilical   incision was made with a scalpel.  A Veress needle was advanced through this   incision into the peritoneal cavity and proper placement in the peritoneal   cavity was verified with a Gerardo hanging drop technique.  The peritoneal   cavity was then insufflated with approximately 2-3 liters of carbon dioxide   gas.  The Veress needle was removed and a 10-12 mm port was introduced through   the infraumbilical incision into the peritoneal cavity utilizing the   VersaStep trocar technique.  The central portion of this port was removed and   a 10 mm operative laparoscope was inserted through the remaining sleeve and   proper entry in the peritoneal cavity was verified visually with laparoscope.    The patient was placed in Trendelenburg position.  A 5 mm port was placed   suprapubically under direct laparoscopic visualization utilizing the VersaStep   trocar system.  A Prestige instrument was placed with the 5 mm port and the   long 5 mm LigaSure bipolar cutting forceps instrument was placed through the   operative channel of the operative laparoscope.  The patient was placed in   further Trendelenburg position.  The uterus was anteverted with the Prestige   instrument and excellent views of the pelvis were obtained and findings were   as noted above and pictures were taken.  The right fallopian tube was   identified and grasped with a Prestige instrument and followed to its distal    fimbriated end.  The distal fimbriated end to the right fallopian tube was   clearly identified.  After the distal fimbriated end to the right fallopian   tube was clearly identified, the right fallopian tube was resected (right   salpingectomy was performed) in the usual fashion by serially thoroughly   cauterizing, sealing, and cutting the right mesosalpinx from distal to   proximal using the LigaSure instrument and then thoroughly cauterizing,   sealing, and cutting the proximal right fallopian tube.  Excellent hemostasis   was observed.  The amputated right fallopian tube was grasped with the   LigaSure instrument and delivered through the infraumbilical port along with   laparoscope and the right fallopian tube was then submitted as a specimen.    The laparoscope was replaced through the infraumbilical port and the uterus   was anteverted with the Prestige instrument and the right adnexa was examined   and excellent hemostasis was observed.  The left fallopian tube was   identified.  The left fallopian tube was grasped with a Prestige instrument.    The left fallopian tube was clearly identified and followed to its distal   fimbriated end.  After the distal fimbriated end to the left fallopian tube   was clearly identified, the left fallopian tube was resected (left   salpingectomy was performed) in the usual fashion by serially thoroughly   cauterizing, sealing, and cutting the left mesosalpinx from distal to proximal   with the LigaSure instrument and then thoroughly cauterizing, sealing, and   cutting the left proximal fallopian tube with the LigaSure instrument and   excellent hemostasis was observed.  The amputated left fallopian tube was   grasped with the Prestige instrument and delivered through the infraumbilical   port along with the laparoscope.  The left fallopian tube was submitted as a   specimen.  The laparoscope was replaced through the infraumbilical port.  The   uterus was anteverted with  the Prestige instrument.  Excellent hemostasis was   observed.  Pictures were taken.  The Prestige instrument was removed and   laparoscope was removed and air was allowed to evacuate the peritoneal cavity   and the patient was taken out of Trendelenburg position.  Both ports were   removed.  The fascia underneath the infraumbilical incision was identified   with use of S retractors, reapproximated with placement of a simple   interrupted suture using Vicryl.  The infraumbilical skin incisions were   reapproximated with placement of 3 interrupted buried sutures of 4-0 Monocryl   placed in the dermis.  The suprapubic skin incisions were reapproximated with   placement of simple interrupted buried suture of 4-0 Monocryl placed in the   dermis.  The procedure was terminated with final lap and needle counts   reported to be correct x2 at the end of the procedure.  The patient tolerated   the procedure well and sent to postanesthesia recovery in stable condition.        ______________________________  Tato Palomino MD    MED/NACHO    DD:  11/17/2022 15:14  DT:  11/17/2022 18:02    Job#:  [id number]    CC:Jun Flores MD

## 2024-01-10 ENCOUNTER — APPOINTMENT (OUTPATIENT)
Dept: RADIOLOGY | Facility: MEDICAL CENTER | Age: 39
End: 2024-01-10
Attending: EMERGENCY MEDICINE
Payer: MEDICAID

## 2024-01-10 ENCOUNTER — HOSPITAL ENCOUNTER (EMERGENCY)
Facility: MEDICAL CENTER | Age: 39
End: 2024-01-10
Attending: EMERGENCY MEDICINE
Payer: MEDICAID

## 2024-01-10 VITALS
HEART RATE: 90 BPM | BODY MASS INDEX: 29.43 KG/M2 | OXYGEN SATURATION: 95 % | HEIGHT: 61 IN | DIASTOLIC BLOOD PRESSURE: 70 MMHG | WEIGHT: 155.87 LBS | RESPIRATION RATE: 18 BRPM | SYSTOLIC BLOOD PRESSURE: 134 MMHG | TEMPERATURE: 97.4 F

## 2024-01-10 DIAGNOSIS — R42 DIZZINESS: ICD-10-CM

## 2024-01-10 DIAGNOSIS — R42 VERTIGO: ICD-10-CM

## 2024-01-10 DIAGNOSIS — R00.0 TACHYCARDIA: ICD-10-CM

## 2024-01-10 DIAGNOSIS — R06.02 SOB (SHORTNESS OF BREATH): ICD-10-CM

## 2024-01-10 LAB
ALBUMIN SERPL BCP-MCNC: 4.6 G/DL (ref 3.2–4.9)
ALBUMIN/GLOB SERPL: 1.4 G/DL
ALP SERPL-CCNC: 84 U/L (ref 30–99)
ALT SERPL-CCNC: 28 U/L (ref 2–50)
ANION GAP SERPL CALC-SCNC: 11 MMOL/L (ref 7–16)
APPEARANCE UR: CLEAR
AST SERPL-CCNC: 19 U/L (ref 12–45)
BASOPHILS # BLD AUTO: 0.3 % (ref 0–1.8)
BASOPHILS # BLD: 0.03 K/UL (ref 0–0.12)
BILIRUB SERPL-MCNC: 0.2 MG/DL (ref 0.1–1.5)
BILIRUB UR QL STRIP.AUTO: NEGATIVE
BUN SERPL-MCNC: 16 MG/DL (ref 8–22)
CALCIUM ALBUM COR SERPL-MCNC: 9 MG/DL (ref 8.5–10.5)
CALCIUM SERPL-MCNC: 9.5 MG/DL (ref 8.4–10.2)
CHLORIDE SERPL-SCNC: 104 MMOL/L (ref 96–112)
CO2 SERPL-SCNC: 24 MMOL/L (ref 20–33)
COLOR UR: YELLOW
CREAT SERPL-MCNC: 0.57 MG/DL (ref 0.5–1.4)
EKG IMPRESSION: NORMAL
EOSINOPHIL # BLD AUTO: 0.08 K/UL (ref 0–0.51)
EOSINOPHIL NFR BLD: 0.8 % (ref 0–6.9)
ERYTHROCYTE [DISTWIDTH] IN BLOOD BY AUTOMATED COUNT: 40.9 FL (ref 35.9–50)
GFR SERPLBLD CREATININE-BSD FMLA CKD-EPI: 118 ML/MIN/1.73 M 2
GLOBULIN SER CALC-MCNC: 3.2 G/DL (ref 1.9–3.5)
GLUCOSE SERPL-MCNC: 89 MG/DL (ref 65–99)
GLUCOSE UR STRIP.AUTO-MCNC: NEGATIVE MG/DL
HCG SERPL QL: NEGATIVE
HCT VFR BLD AUTO: 40.9 % (ref 37–47)
HGB BLD-MCNC: 13.8 G/DL (ref 12–16)
IMM GRANULOCYTES # BLD AUTO: 0.03 K/UL (ref 0–0.11)
IMM GRANULOCYTES NFR BLD AUTO: 0.3 % (ref 0–0.9)
KETONES UR STRIP.AUTO-MCNC: NEGATIVE MG/DL
LEUKOCYTE ESTERASE UR QL STRIP.AUTO: NEGATIVE
LYMPHOCYTES # BLD AUTO: 2.33 K/UL (ref 1–4.8)
LYMPHOCYTES NFR BLD: 22.8 % (ref 22–41)
MCH RBC QN AUTO: 30.7 PG (ref 27–33)
MCHC RBC AUTO-ENTMCNC: 33.7 G/DL (ref 32.2–35.5)
MCV RBC AUTO: 91.1 FL (ref 81.4–97.8)
MICRO URNS: NORMAL
MONOCYTES # BLD AUTO: 0.75 K/UL (ref 0–0.85)
MONOCYTES NFR BLD AUTO: 7.3 % (ref 0–13.4)
NEUTROPHILS # BLD AUTO: 7.02 K/UL (ref 1.82–7.42)
NEUTROPHILS NFR BLD: 68.5 % (ref 44–72)
NITRITE UR QL STRIP.AUTO: NEGATIVE
NRBC # BLD AUTO: 0 K/UL
NRBC BLD-RTO: 0 /100 WBC (ref 0–0.2)
NT-PROBNP SERPL IA-MCNC: <36 PG/ML (ref 0–125)
PH UR STRIP.AUTO: 6.5 [PH] (ref 5–8)
PLATELET # BLD AUTO: 400 K/UL (ref 164–446)
PMV BLD AUTO: 10.3 FL (ref 9–12.9)
POTASSIUM SERPL-SCNC: 4.2 MMOL/L (ref 3.6–5.5)
PROT SERPL-MCNC: 7.8 G/DL (ref 6–8.2)
PROT UR QL STRIP: NEGATIVE MG/DL
RBC # BLD AUTO: 4.49 M/UL (ref 4.2–5.4)
RBC UR QL AUTO: NEGATIVE
SODIUM SERPL-SCNC: 139 MMOL/L (ref 135–145)
SP GR UR STRIP.AUTO: <=1.005
TROPONIN T SERPL-MCNC: <6 NG/L (ref 6–19)
TSH SERPL DL<=0.005 MIU/L-ACNC: 0.88 UIU/ML (ref 0.38–5.33)
WBC # BLD AUTO: 10.2 K/UL (ref 4.8–10.8)

## 2024-01-10 PROCEDURE — 700117 HCHG RX CONTRAST REV CODE 255: Performed by: EMERGENCY MEDICINE

## 2024-01-10 PROCEDURE — 80053 COMPREHEN METABOLIC PANEL: CPT

## 2024-01-10 PROCEDURE — 700102 HCHG RX REV CODE 250 W/ 637 OVERRIDE(OP): Performed by: EMERGENCY MEDICINE

## 2024-01-10 PROCEDURE — 99284 EMERGENCY DEPT VISIT MOD MDM: CPT

## 2024-01-10 PROCEDURE — 83880 ASSAY OF NATRIURETIC PEPTIDE: CPT

## 2024-01-10 PROCEDURE — 94760 N-INVAS EAR/PLS OXIMETRY 1: CPT

## 2024-01-10 PROCEDURE — 84484 ASSAY OF TROPONIN QUANT: CPT

## 2024-01-10 PROCEDURE — A9270 NON-COVERED ITEM OR SERVICE: HCPCS | Performed by: EMERGENCY MEDICINE

## 2024-01-10 PROCEDURE — 71275 CT ANGIOGRAPHY CHEST: CPT

## 2024-01-10 PROCEDURE — 85025 COMPLETE CBC W/AUTO DIFF WBC: CPT

## 2024-01-10 PROCEDURE — 84443 ASSAY THYROID STIM HORMONE: CPT

## 2024-01-10 PROCEDURE — 36415 COLL VENOUS BLD VENIPUNCTURE: CPT

## 2024-01-10 PROCEDURE — 84703 CHORIONIC GONADOTROPIN ASSAY: CPT

## 2024-01-10 PROCEDURE — 93005 ELECTROCARDIOGRAM TRACING: CPT | Performed by: EMERGENCY MEDICINE

## 2024-01-10 PROCEDURE — 81003 URINALYSIS AUTO W/O SCOPE: CPT

## 2024-01-10 RX ORDER — MECLIZINE HYDROCHLORIDE 25 MG/1
25 TABLET ORAL ONCE
Status: COMPLETED | OUTPATIENT
Start: 2024-01-10 | End: 2024-01-10

## 2024-01-10 RX ORDER — SCOLOPAMINE TRANSDERMAL SYSTEM 1 MG/1
1 PATCH, EXTENDED RELEASE TRANSDERMAL
COMMUNITY

## 2024-01-10 RX ORDER — MECLIZINE HYDROCHLORIDE 25 MG/1
25 TABLET ORAL 3 TIMES DAILY PRN
Qty: 30 TABLET | Refills: 0 | Status: SHIPPED | OUTPATIENT
Start: 2024-01-10

## 2024-01-10 RX ORDER — GABAPENTIN 300 MG/1
300 CAPSULE ORAL 3 TIMES DAILY PRN
COMMUNITY

## 2024-01-10 RX ORDER — CYCLOBENZAPRINE HCL 10 MG
10 TABLET ORAL 2 TIMES DAILY PRN
COMMUNITY

## 2024-01-10 RX ORDER — OXYCODONE HYDROCHLORIDE AND ACETAMINOPHEN 5; 325 MG/1; MG/1
1 TABLET ORAL EVERY 4 HOURS PRN
COMMUNITY

## 2024-01-10 RX ORDER — CEPHALEXIN 500 MG/1
500 CAPSULE ORAL 4 TIMES DAILY
COMMUNITY

## 2024-01-10 RX ORDER — ONDANSETRON 4 MG/1
4 TABLET, ORALLY DISINTEGRATING ORAL EVERY 6 HOURS PRN
COMMUNITY

## 2024-01-10 RX ADMIN — IOHEXOL 75 ML: 350 INJECTION, SOLUTION INTRAVENOUS at 15:58

## 2024-01-10 RX ADMIN — MECLIZINE HYDROCHLORIDE 25 MG: 25 TABLET ORAL at 16:38

## 2024-01-10 NOTE — ED NOTES
Pt roomed. S/p line and lab in triage. Aware of pending ct. Placed on cardiac moniter,call lightin reach,denies further needs

## 2024-01-10 NOTE — ED TRIAGE NOTES
Pt amb to triage with   Chief Complaint   Patient presents with    Dizziness     X 4 days, tummy tuck 3 weeks ago by Dr. Villanueva, sent in by surgeon    Shortness of Breath     Pt reports she saw Dr. Villanueva this morning due to SOB and dizziness with ambulation over the last few days. She had surgery for a tummy tuck and the MD believes she needs to be ruled out for a blood clot.

## 2024-01-11 NOTE — ED NOTES
Pt states improvement in previous s/s after med. Amb to br for void. Urine spec obtained and to lab

## 2024-01-11 NOTE — ED NOTES
Medication history reviewed with pt and pts pharmacy ([facility]). Med rec is complete.  Allergies reviewed, per pt  Interviewed pt with friend at bedside with permission from pt.    Pt was not sure the names and strength of her medications.   Called [facility] @ [phone] to verify all medications. Went back and asked pt tlast time she took these medications.     Patient has had outpatient antibiotics in the last 30 days, pt started KEFLEX 500MG on 12/21/2023 for 5 day course.  Pt reports that she did finish course of antibiotic.       Pt is not on any anticoagulants

## 2024-01-11 NOTE — ED NOTES
Dc instructions and prescription reviewed with pt. Aware of need to  same at [facility] on [facility] and take as needed, f/u with cardiology, pcp and neurology, return for worsening s/s

## 2024-01-11 NOTE — ED PROVIDER NOTES
ED Provider Note    CHIEF COMPLAINT  Chief Complaint   Patient presents with    Dizziness     X 4 days, tummy tuck 3 weeks ago by Dr. Villanueva, sent in by surgeon    Shortness of Breath       EXTERNAL RECORDS REVIEWED  Outpatient Notes from neurosurgeons nurse earlier today concerned about a blood clot    HPI/ROS  LIMITATION TO HISTORY   Select: : None  OUTSIDE HISTORIAN(S):  Family at bedside    Jazzy Pryor is a 38 y.o. female who presents to the ED secondary to dizziness and shortness of breath.  The patient's had a tummy tuck approximately 3 weeks ago, since that time the patient has been feeling very dizzy, she has intermittent episodes of lightheadedness also when she gets up and walks around She feels more short of breath and dizzy.  Her heart rate has also been higher.  She was seen by her surgeons nurse today who sent her in to rule out a blood clot.    PAST MEDICAL HISTORY       SURGICAL HISTORY   has a past surgical history that includes hysteroscopy,w/endometrial ablation (N/A, 11/17/2022); lap,rmv  adnexal structure (Bilateral, 11/17/2022); and other.    FAMILY HISTORY  History reviewed. No pertinent family history.    SOCIAL HISTORY  Social History     Tobacco Use    Smoking status: Never     Passive exposure: Never    Smokeless tobacco: Never   Vaping Use    Vaping Use: Never used   Substance and Sexual Activity    Alcohol use: Yes     Comment: 1 every 3 months    Drug use: Never    Sexual activity: Not on file       CURRENT MEDICATIONS  Home Medications       Reviewed by Herb Martel (Pharmacy Tech) on 01/10/24 at 1704  Med List Status: Complete     Medication Last Dose Status   cephALEXin (KEFLEX) 500 MG Cap 12/25/2023 Active   cyclobenzaprine (FLEXERIL) 10 mg Tab > 2 weeks Active   gabapentin (NEURONTIN) 300 MG Cap 12/28/2023 Active   ondansetron (ZOFRAN ODT) 4 MG TABLET DISPERSIBLE > 2 weeks Active   oxyCODONE-acetaminophen (PERCOCET) 5-325 MG Tab > 2 weeks Active   scopolamine  "(TRANSDERM-SCOP) 1 mg/72hr PATCH 72 HR 12/20/2023 Active                    ALLERGIES  Allergies   Allergen Reactions    Cipro [Ciprofloxacin Hcl]      rash       PHYSICAL EXAM  VITAL SIGNS: /70   Pulse 90   Temp 36.3 °C (97.4 °F) (Temporal)   Resp 18   Ht 1.549 m (5' 1\")   Wt 70.7 kg (155 lb 13.8 oz)   LMP 12/21/2023 (Exact Date)   SpO2 95%   BMI 29.45 kg/m²    Well-developed and nourished 38-year-old female who appears in mild distress  Atraumatic, normocephalic, oropharynx is clear  Neck is supple  Tachycardic  Clear to auscultation  Abdomen with a binder on  No lower extremity edema or cords    DIAGNOSTIC STUDIES / PROCEDURES  Results for orders placed or performed during the hospital encounter of 01/10/24   CBC w/ Differential   Result Value Ref Range    WBC 10.2 4.8 - 10.8 K/uL    RBC 4.49 4.20 - 5.40 M/uL    Hemoglobin 13.8 12.0 - 16.0 g/dL    Hematocrit 40.9 37.0 - 47.0 %    MCV 91.1 81.4 - 97.8 fL    MCH 30.7 27.0 - 33.0 pg    MCHC 33.7 32.2 - 35.5 g/dL    RDW 40.9 35.9 - 50.0 fL    Platelet Count 400 164 - 446 K/uL    MPV 10.3 9.0 - 12.9 fL    Neutrophils-Polys 68.50 44.00 - 72.00 %    Lymphocytes 22.80 22.00 - 41.00 %    Monocytes 7.30 0.00 - 13.40 %    Eosinophils 0.80 0.00 - 6.90 %    Basophils 0.30 0.00 - 1.80 %    Immature Granulocytes 0.30 0.00 - 0.90 %    Nucleated RBC 0.00 0.00 - 0.20 /100 WBC    Neutrophils (Absolute) 7.02 1.82 - 7.42 K/uL    Lymphs (Absolute) 2.33 1.00 - 4.80 K/uL    Monos (Absolute) 0.75 0.00 - 0.85 K/uL    Eos (Absolute) 0.08 0.00 - 0.51 K/uL    Baso (Absolute) 0.03 0.00 - 0.12 K/uL    Immature Granulocytes (abs) 0.03 0.00 - 0.11 K/uL    NRBC (Absolute) 0.00 K/uL   Complete Metabolic Panel (CMP)   Result Value Ref Range    Sodium 139 135 - 145 mmol/L    Potassium 4.2 3.6 - 5.5 mmol/L    Chloride 104 96 - 112 mmol/L    Co2 24 20 - 33 mmol/L    Anion Gap 11.0 7.0 - 16.0    Glucose 89 65 - 99 mg/dL    Bun 16 8 - 22 mg/dL    Creatinine 0.57 0.50 - 1.40 mg/dL    " Calcium 9.5 8.4 - 10.2 mg/dL    Correct Calcium 9.0 8.5 - 10.5 mg/dL    AST(SGOT) 19 12 - 45 U/L    ALT(SGPT) 28 2 - 50 U/L    Alkaline Phosphatase 84 30 - 99 U/L    Total Bilirubin 0.2 0.1 - 1.5 mg/dL    Albumin 4.6 3.2 - 4.9 g/dL    Total Protein 7.8 6.0 - 8.2 g/dL    Globulin 3.2 1.9 - 3.5 g/dL    A-G Ratio 1.4 g/dL   Troponin - STAT Once   Result Value Ref Range    Troponin T <6 6 - 19 ng/L   proBrain Natriuretic Peptide, NT   Result Value Ref Range    NT-proBNP <36 0 - 125 pg/mL   HCG QUAL SERUM   Result Value Ref Range    Beta-Hcg Qualitative Serum Negative Negative   ESTIMATED GFR   Result Value Ref Range    GFR (CKD-EPI) 118 >60 mL/min/1.73 m 2   TSH WITH REFLEX TO FT4   Result Value Ref Range    TSH 0.876 0.380 - 5.330 uIU/mL   URINALYSIS    Specimen: Urine   Result Value Ref Range    Color Yellow     Character Clear     Specific Gravity <=1.005 <1.035    Ph 6.5 5.0 - 8.0    Glucose Negative Negative mg/dL    Ketones Negative Negative mg/dL    Protein Negative Negative mg/dL    Bilirubin Negative Negative    Nitrite Negative Negative    Leukocyte Esterase Negative Negative    Occult Blood Negative Negative    Micro Urine Req see below    EKG   Result Value Ref Range    Report       [facility]    Test Date:  2024-01-10  Pt Name:    NABILA ABDI                 Department: [facility]  MRN:        [id number]                      [facility]  Gender:     Female                       Technician: [id number]  :        1985                   Requested By:MERLY MARIE  Order #:    [id number]                    Reading MD: MERLY MARIE MD    Measurements  Intervals                                Axis  Rate:       87                           P:          53  CO:         129                          QRS:        52  QRSD:       88                           T:          7  QT:         360  QTc:        433    Interpretive Statements  Sinus rhythm  No previous ECG  available for comparison  Electronically Signed On 01- 18:15:08 PST by AMADO MARIE MD          RADIOLOGY  I have independently interpreted the diagnostic imaging associated with this visit and am waiting the final reading from the radiologist.   My preliminary interpretation is as follows: No PE  Radiologist interpretation:   CT-CTA CHEST PULMONARY ARTERY W/ RECONS   Final Result      1.  No evidence of pulmonary embolus.      2.  Linear scarring versus atelectasis within the left lung base.      3.  Small hiatal hernia.      4.  Fatty liver.              INITIAL ASSESSMENT, COURSE AND PLAN  Care Narrative: Patient with tachycardia shortness of breath dizziness likely from either vasovagal or orthostatic hypotension.  Will check basic laboratory test to make sure the patient is not dehydrated, does not have renal failure, no anemia.  We will do a CT PE study to rule out a PE.    Thyroids negative, patient's urinalysis is negative, the patient's EKG is unremarkable, the patient's PE study is negative, electrolytes are normal, CBC is normal.  At this time I do not know the etiology of the patient's symptoms.  I did give the patient some meclizine, thinking this could be some vertigo.  She is slightly improved from this.  I do not know if she is developing POTS syndrome.  Will refer the patient to neurology and cardiology, have the patient return with worsening symptoms.  Long conversation with the patient's sister who is a dentist      DISPOSITION AND DISCUSSIONS  Escalation of care considered, and ultimately not performed:acute inpatient care management, however at this time, the patient is most appropriate for outpatient management  FINAL DIAGNOSIS  1. Dizziness    2. Tachycardia    3. Vertigo    4. SOB (shortness of breath)           Electronically signed by: Amado Marie M.D., 1/10/2024 4:07 PM

## 2024-01-19 ENCOUNTER — TELEPHONE (OUTPATIENT)
Dept: HEALTH INFORMATION MANAGEMENT | Facility: OTHER | Age: 39
End: 2024-01-19
Payer: MEDICAID

## (undated) DEVICE — SODIUM CHL. IRRIGATION 0.9% 3000ML (4EA/CA 65CA/PF)

## (undated) DEVICE — LACTATED RINGERS INJ 1000 ML - (14EA/CA 60CA/PF)

## (undated) DEVICE — WATER IRRIGATION STERILE 1000ML (12EA/CA)

## (undated) DEVICE — CANNULA W/ SUPPLY TUBING O2 - (50/CA)

## (undated) DEVICE — KIT  I.V. START (100EA/CA)

## (undated) DEVICE — SYRINGE NON SAFETY 3 CC 21 GA X 1 1/2 IN (100/BX 8BX/CA)

## (undated) DEVICE — SET LEADWIRE 5 LEAD BEDSIDE DISPOSABLE ECG (1SET OF 5/EA)

## (undated) DEVICE — TOWEL STOP TIMEOUT SAFETY FLAG (40EA/CA)

## (undated) DEVICE — SUCTION INSTRUMENT YANKAUER BULBOUS TIP W/O VENT (50EA/CA)

## (undated) DEVICE — GLOVE BIOGEL SZ 7.5 SURGICAL PF LTX - (50PR/BX 4BX/CA)

## (undated) DEVICE — SENSOR OXIMETER ADULT SPO2 RD SET (20EA/BX)

## (undated) DEVICE — SUTURE 0 VICRYL PLUS UR-6 - 27 INCH (36/BX)

## (undated) DEVICE — Device

## (undated) DEVICE — KIT HTA GENESYS - (5/BX)

## (undated) DEVICE — LIGASURE 5MM BLUNT TIP LONG - 44CM (6EA/PK)

## (undated) DEVICE — PAD SANITARY 11IN MAXI IND WRAPPED  (12EA/PK 24PK/CA)

## (undated) DEVICE — SODIUM CHL IRRIGATION 0.9% 1000ML (12EA/CA)

## (undated) DEVICE — NEEDLE INSUFFLATION FOR STEP - (12/BX)

## (undated) DEVICE — DERMABOND ADVANCED - (12EA/BX)

## (undated) DEVICE — CANISTER SUCTION 3000ML MECHANICAL FILTER AUTO SHUTOFF MEDI-VAC NONSTERILE LF DISP  (40EA/CA)

## (undated) DEVICE — BANDAID X-LARGE 2 X 4 IN LF (50EA/BX)

## (undated) DEVICE — TROCAR STEP 11MM - (3/CA)

## (undated) DEVICE — TUBE E-T HI-LO CUFF 7.5MM (10EA/PK)

## (undated) DEVICE — TUBE CONNECTING SUCTION - CLEAR PLASTIC STERILE 72 IN (50EA/CA)

## (undated) DEVICE — SLEEVE VASO CALF MED - (10PR/CA)

## (undated) DEVICE — TUBING CLEARLINK DUO-VENT - C-FLO (48EA/CA)

## (undated) DEVICE — GOWN WARMING STANDARD FLEX - (30/CA)

## (undated) DEVICE — SUTURE 4-0 MONOCRYL PLUS PS-2 - 27 INCH (36/BX)

## (undated) DEVICE — BANDAID SHEER STRIP 3/4 IN (100EA/BX 12BX/CA)

## (undated) DEVICE — DRAPE 36X28IN RAD CARM BND BG - (25/CA) O

## (undated) DEVICE — CANISTER SUCTION RIGID RED 1500CC (40EA/CA)

## (undated) DEVICE — SUTURE GENERAL

## (undated) DEVICE — MASK OXYGEN VNYL ADLT MED CONC WITH 7 FOOT TUBING  - (50EA/CA)

## (undated) DEVICE — GOWN SURGEONS X-LARGE - DISP. (30/CA)